# Patient Record
Sex: MALE | Race: WHITE | NOT HISPANIC OR LATINO | Employment: FULL TIME | URBAN - METROPOLITAN AREA
[De-identification: names, ages, dates, MRNs, and addresses within clinical notes are randomized per-mention and may not be internally consistent; named-entity substitution may affect disease eponyms.]

---

## 2017-10-22 DIAGNOSIS — F34.1 DYSTHYMIA: ICD-10-CM

## 2017-10-23 NOTE — TELEPHONE ENCOUNTER
Disp Refills Start End LOAN   FLUoxetine (PROZAC) 20 MG capsule 90 capsule 2 11/22/2016  No   Sig: Take 1 capsule (20 mg) by mouth daily   Class: E-Prescribe   Notes to Pharmacy: Profile Rx: patient will contact pharmacy when needed     Last Office Visit with [facility] primary care provider:  11/22/2016      Last PHQ-9 score on record=   PHQ-9 SCORE 5/9/2017   Total Score MyChart 1 (Minimal depression)   Total Score -     Due for an Office visit (fasting physical) for further refills, only filled for 30 days.    Ni Martinez RN  [facility]

## 2017-11-24 ENCOUNTER — MYC MEDICAL ADVICE (OUTPATIENT)
Dept: FAMILY MEDICINE | Facility: CLINIC | Age: 27
End: 2017-11-24

## 2017-11-24 DIAGNOSIS — F34.1 DYSTHYMIA: ICD-10-CM

## 2017-11-24 NOTE — TELEPHONE ENCOUNTER
Requested Prescriptions   Pending Prescriptions Disp Refills     FLUoxetine (PROZAC) 20 MG capsule [Pharmacy Med Name: FLUOXETINE HCL 20 MG CAPSULE] 30 capsule 0     Sig: TAKE 1 CAPSULE (20 MG) BY MOUTH DAILY    SSRIs Protocol Failed    11/24/2017 12:48 AM       Failed - PHQ-9 score less than 5 in past 6 months    Please review PHQ-9 score.          Failed - Recent (6 mo) or future visit with authorizing provider's specialty    Patient had office visit in the last 6 months or has a visit in the next 30 days with authorizing provider.  See chart review.            Passed - Medication is NOT Bupropion    If the medication is Bupropion (Wellbutrin), and the patient is taking for smoking cessation; OK to refill.         Passed - Patient is age 18 or older        PHQ-9 SCORE 10/21/2016 11/22/2016 5/9/2017   Total Score MyChart - - 1 (Minimal depression)   Total Score 5 1 -     LOV 11/22/2016. Sent mychart with PHQ-9 attached and noted to pt they need an OV as well.  Lilly Santiago RN  [facility]

## 2017-11-24 NOTE — LETTER
[address] [address]                  [phone]   December 4, 2017    Jhony Murphy  [address] [facility] [address]      Dear Jhony,        Thank you for your refill request for yourFLUoxetine (PROZAC) 20 MG capsule   after reviewing your chart you are due for an updated PHQ-9 and DICK-7, which are questionnaires regarding your mood over the last 2 weeks. You are also due for an office visit     Please fill them out and send it back to the clinic, please also call to schedule an appointment       Your test results are enclosed.      Please contact me if you have any questions.    In addition, here is a list of due or overdue Health Maintenance reminders.    Health Maintenance Due   Topic Date Due     Flu Vaccine - yearly  09/01/2017       Please call us at [phone] (or use [employer]) to address the above recommendations.            Thank you very much for trusting [facility]..     Healthy regards,          Ronnie Lutz M.D.

## 2017-12-04 NOTE — TELEPHONE ENCOUNTER
LOV 10/21/2016    Attempt # 3 - letter sent     Called # [phone] (home)     Left a non detailed VM     Would you be willing to fill without and OV or updated phq-9 and gad7 ?     Please advise     Thank you     Svetlana Clements RN, BSN  [facility]

## 2017-12-29 ASSESSMENT — ANXIETY QUESTIONNAIRES
1. FEELING NERVOUS, ANXIOUS, OR ON EDGE: SEVERAL DAYS
3. WORRYING TOO MUCH ABOUT DIFFERENT THINGS: NOT AT ALL
6. BECOMING EASILY ANNOYED OR IRRITABLE: SEVERAL DAYS
GAD7 TOTAL SCORE: 2
4. TROUBLE RELAXING: NOT AT ALL
GAD7 TOTAL SCORE: 2
7. FEELING AFRAID AS IF SOMETHING AWFUL MIGHT HAPPEN: NOT AT ALL
2. NOT BEING ABLE TO STOP OR CONTROL WORRYING: NOT AT ALL
7. FEELING AFRAID AS IF SOMETHING AWFUL MIGHT HAPPEN: NOT AT ALL
5. BEING SO RESTLESS THAT IT IS HARD TO SIT STILL: NOT AT ALL
GAD7 TOTAL SCORE: 2

## 2017-12-29 ASSESSMENT — PATIENT HEALTH QUESTIONNAIRE - PHQ9
SUM OF ALL RESPONSES TO PHQ QUESTIONS 1-9: 3
SUM OF ALL RESPONSES TO PHQ QUESTIONS 1-9: 3
10. IF YOU CHECKED OFF ANY PROBLEMS, HOW DIFFICULT HAVE THESE PROBLEMS MADE IT FOR YOU TO DO YOUR WORK, TAKE CARE OF THINGS AT HOME, OR GET ALONG WITH OTHER PEOPLE: NOT DIFFICULT AT ALL

## 2017-12-30 ASSESSMENT — PATIENT HEALTH QUESTIONNAIRE - PHQ9: SUM OF ALL RESPONSES TO PHQ QUESTIONS 1-9: 3

## 2017-12-30 ASSESSMENT — ANXIETY QUESTIONNAIRES: GAD7 TOTAL SCORE: 2

## 2018-01-03 NOTE — TELEPHONE ENCOUNTER
DICK-7 SCORE 12/29/2017   Total Score 2 (minimal anxiety)   Total Score 2       PHQ-9 SCORE 11/22/2016 5/9/2017 12/29/2017   Total Score MyChart - 1 (Minimal depression) 3 (Minimal depression)   Total Score 1 - 3     Routing to PCP for further review/recommendations/orders.  Pt needs an appt for follow up.  Lilly Santiago RN  [facility]

## 2018-01-12 NOTE — PROGRESS NOTES
"  SUBJECTIVE:   CC: Jhony Murphy is an 27 year old male who presents for preventative health visit.     Healthy Habits:    Do you get at least three servings of calcium containing foods daily (dairy, green leafy vegetables, etc.)? {YES/NO, DAIRY INTAKE:033211::\"yes\"}    Amount of exercise or daily activities, outside of work: {AMOUNT EXERCISE:622289}    Problems taking medications regularly {Yes /No default:358984::\"No\"}    Medication side effects: {Yes /No default.:477974::\"No\"}    Have you had an eye exam in the past two years? {YESNOBLANK:402901}    Do you see a dentist twice per year? {YESNOBLANK:622302}    Do you have sleep apnea, excessive snoring or daytime drowsiness?{YESNOBLANK:424448}  {Outside tests to abstract? :776153}     {additional problems to add (Optional):007246}    Today's PHQ-2 Score:   PHQ-2 ( 1999 Pfizer) 10/21/2016 10/21/2016   Q1: Little interest or pleasure in doing things - 0   Q2: Feeling down, depressed or hopeless 0 0   PHQ-2 Score - 0     {PHQ-2 LOOK IN ASSESSMENTS :193773}  Abuse: Current or Past(Physical, Sexual or Emotional)- {YES/NO/NA:386639}  Do you feel safe in your environment - {YES/NO/NA:206402}    Social History   Substance Use Topics     Smoking status: Never Smoker     Smokeless tobacco: Never Used     Alcohol use Yes      Comment: Social      If you drink alcohol do you typically have >3 drinks per day or >7 drinks per week? {ETOH :259008}                      Last PSA: No results found for: PSA    Reviewed orders with patient. Reviewed health maintenance and updated orders accordingly - {Yes/No:676506::\"Yes\"}  {Chronicprobdata (Optional):332540}    Reviewed and updated as needed this visit by clinical staff         Reviewed and updated as needed this visit by Provider        {HISTORY OPTIONS (Optional):334706}    ROS:  {MALE ROS-adult preventive care package:967503::\"C: NEGATIVE for fever, chills, change in weight\",\"I: NEGATIVE for worrisome rashes, moles or " "lesions\",\"E: NEGATIVE for vision changes or irritation\",\"ENT: NEGATIVE for ear, mouth and throat problems\",\"R: NEGATIVE for significant cough or SOB\",\"CV: NEGATIVE for chest pain, palpitations or peripheral edema\",\"GI: NEGATIVE for nausea, abdominal pain, heartburn, or change in bowel habits\",\" male: negative for dysuria, hematuria, decreased urinary stream, erectile dysfunction, urethral discharge\",\"M: NEGATIVE for significant arthralgias or myalgia\",\"N: NEGATIVE for weakness, dizziness or paresthesias\",\"P: NEGATIVE for changes in mood or affect\"}    OBJECTIVE:   There were no vitals taken for this visit.  EXAM:  {Exam Choices:548284}    ASSESSMENT/PLAN:   {Diag Picklist:051269}    COUNSELING:  {MALE COUNSELING MESSAGES:022679::\"Reviewed preventive health counseling, as reflected in patient instructions\"}    {BP Counseling- Complete if BP >= 120/80  (Optional):089465}   reports that he has never smoked. He has never used smokeless tobacco.  {Tobacco Cessation -- Complete if patient is a smoker (Optional):240729}  Estimated body mass index is 36.48 kg/(m^2) as calculated from the following:    Height as of 11/22/16: 6' (1.829 m).    Weight as of 11/22/16: 269 lb (122 kg).   {Weight Management Plan (ACO) Complete if BMI is abnormal-  Ages 18-64  BMI >24.9.  Age 65+ with BMI <23 or >30 (Optional):359127}    Counseling Resources:  ATP IV Guidelines  Pooled Cohorts Equation Calculator  FRAX Risk Assessment  ICSI Preventive Guidelines  Dietary Guidelines for Americans, 2010  USDA's MyPlate  ASA Prophylaxis  Lung CA Screening    Graham Lutz MD  [facility] [place]  "

## 2018-01-12 NOTE — PROGRESS NOTES
SUBJECTIVE:   CC: Jhony Murphy is an 27 year old male who presents for preventative health visit.     Physical   Annual:     Getting at least 3 servings of Calcium per day::  Yes    Bi-annual eye exam::  Yes    Dental care twice a year::  Yes    Sleep apnea or symptoms of sleep apnea::  None    Diet::  Regular (no restrictions)    Frequency of exercise::  2-3 days/week    Duration of exercise::  15-30 minutes    Taking medications regularly::  Yes    Medication side effects::  None    Additional concerns today::  No          Mood Problems - The patient is currently taking 20 mg of fluoxetine once daily for management of anxiety and depression symptoms. He states the medication controls his symptoms well. When he does not take the medication for a few days he begins to have increased anxiety. The fluoxetine was started in 11/2016. He has a family history of anxiety and depression.    Weight Gain - The patient has gained 15 pounds over the last year. He states that he likes to eat a lot of sugared foods and has soda 3-4 times a week. He had plantar fascitis one year ago which caused him to decrease his daily exercise routine.    Today's PHQ-2 Score:   PHQ-2 ( 1999 Pfizer) 1/12/2018   Q1: Little interest or pleasure in doing things 0   Q2: Feeling down, depressed or hopeless 0   PHQ-2 Score 0   Q1: Little interest or pleasure in doing things Not at all   Q2: Feeling down, depressed or hopeless Not at all   PHQ-2 Score 0       Abuse: Current or Past(Physical, Sexual or Emotional)- NO  Do you feel safe in your environment - Yes    Social History   Substance Use Topics     Smoking status: Never Smoker     Smokeless tobacco: Never Used     Alcohol use Yes      Comment: Social     Alcohol Use 1/12/2018   If you drink alcohol, do you typically have greater than 3 drinks per day OR greater than 7 drinks per week?   No     Last PSA: No results found for: PSA    Reviewed orders with patient. Reviewed health maintenance and  updated orders accordingly - Yes    Reviewed and updated as needed this visit by clinical staff  Tobacco  Allergies  Meds  Problems  Med Hx  Surg Hx  Fam Hx  Soc Hx          Reviewed and updated as needed this visit by Provider  Allergies  Meds  Problems  Surg Hx  Fam Hx          Review of Systems  Constitutional, HEENT, cardiovascular, pulmonary, GI, , musculoskeletal, neuro, skin, endocrine and psych systems are negative, except as otherwise noted.    This document serves as a record of the services and decisions personally performed and made by Graham Lutz MD. It was created on his behalf by Iram Lockett, a trained medical scribe. The creation of this document is based on the provider's statements to the medical scribe.  Iram Lockett 4:18 PM 1/15/2018  OBJECTIVE:   /88 (BP Location: Left arm, Patient Position: Sitting, Cuff Size: Adult Large)  Pulse 72  Temp 98  F (36.7  C) (Oral)  Ht 6' (1.829 m)  Wt 286 lb (129.7 kg)  SpO2 98%  BMI 38.79 kg/m2    Physical Exam  GENERAL: healthy, alert and no distress  EYES: Eyes grossly normal to inspection, PERRL and conjunctivae and sclerae normal  HENT: ear canals and TM's normal, nose and mouth without ulcers or lesions  NECK: no adenopathy, no asymmetry, masses, or scars and thyroid normal to palpation  RESP: lungs clear to auscultation - no rales, rhonchi or wheezes  BREAST: normal without masses, tenderness or nipple discharge and no palpable axillary masses or adenopathy  CV: regular rate and rhythm, normal S1 S2, no S3 or S4, no murmur, click or rub, no peripheral edema and peripheral pulses strong  ABDOMEN: soft, nontender, no hepatosplenomegaly, no masses and bowel sounds normal   (male): normal male genitalia without lesions or urethral discharge, no hernia  MS: no gross musculoskeletal defects noted, no edema  SKIN: no suspicious lesions or rashes  NEURO: Normal strength and tone, mentation intact and speech normal  PSYCH:  mentation appears normal, affect normal/bright  LYMPH: no cervical, supraclavicular, axillary, or inguinal adenopathy    Diagnostic Results:  Future fasting labs  ASSESSMENT/PLAN:   Jhony was seen today for physical.    Diagnoses and all orders for this visit:    Routine general medical examination at a health care facility - followup with fasting labs.  -     Basic metabolic panel; Future  -     Lipid panel reflex to direct LDL Fasting; Future    Dysthymia - controlled - continue medication. Refill provided.  -     FLUoxetine (PROZAC) 20 MG capsule; Take 1 capsule (20 mg) by mouth daily    Need for prophylactic vaccination and inoculation against influenza - patient declined immunization.    Elevated glucose - Discussed decreasing sugar intake. Patient will followup with fasting labs.  -     Basic metabolic panel; Future        COUNSELING:   Reviewed preventive health counseling, as reflected in patient instructions       Regular exercise       Healthy diet/nutrition       Vision screening       Hearing screening       Immunizations    Declined: Influenza due to Concerns about side effects/safety            BP Screening:   Last 3 BP Readings:    BP Readings from Last 3 Encounters:   01/15/18 122/88   11/22/16 126/88   10/21/16 120/88       The following was recommended to the patient:  Re-screen BP within a year and recommended lifestyle modifications       reports that he has never smoked. He has never used smokeless tobacco.    Estimated body mass index is 38.79 kg/(m^2) as calculated from the following:    Height as of this encounter: 6' (1.829 m).    Weight as of this encounter: 286 lb (129.7 kg).   Weight management plan: Discussed healthy diet and exercise guidelines and patient will follow up in 12 months in clinic to re-evaluate.    Counseling Resources:  ATP IV Guidelines  Pooled Cohorts Equation Calculator  FRAX Risk Assessment  ICSI Preventive Guidelines  Dietary Guidelines for Americans, 2010  USDA's  MyPlate  ASA Prophylaxis  Lung CA Screening    The information in this document, created by a scribe for me, accurately reflects the services I personally performed and the decisions made by me. I have reviewed and approved this document for accuracy.    Graham Lutz MD  [facility] [place]  Answers for HPI/ROS submitted by the patient on 1/12/2018   PHQ-2 Score: 0

## 2018-01-15 ENCOUNTER — OFFICE VISIT (OUTPATIENT)
Dept: FAMILY MEDICINE | Facility: CLINIC | Age: 28
End: 2018-01-15
Payer: COMMERCIAL

## 2018-01-15 VITALS
HEART RATE: 72 BPM | TEMPERATURE: 98 F | DIASTOLIC BLOOD PRESSURE: 88 MMHG | SYSTOLIC BLOOD PRESSURE: 122 MMHG | HEIGHT: 72 IN | OXYGEN SATURATION: 98 % | BODY MASS INDEX: 38.74 KG/M2 | WEIGHT: 286 LBS

## 2018-01-15 DIAGNOSIS — Z23 NEED FOR PROPHYLACTIC VACCINATION AND INOCULATION AGAINST INFLUENZA: ICD-10-CM

## 2018-01-15 DIAGNOSIS — F34.1 DYSTHYMIA: ICD-10-CM

## 2018-01-15 DIAGNOSIS — Z00.00 ROUTINE GENERAL MEDICAL EXAMINATION AT A HEALTH CARE FACILITY: Primary | ICD-10-CM

## 2018-01-15 DIAGNOSIS — R73.09 ELEVATED GLUCOSE: ICD-10-CM

## 2018-01-15 PROCEDURE — 99395 PREV VISIT EST AGE 18-39: CPT | Performed by: FAMILY MEDICINE

## 2018-01-15 NOTE — LETTER
My Depression Action Plan  Name: Jhony Murphy   Date of Birth 1990  Date: 1/15/2018    My doctor: Graham Lutz   My clinic: [address] [id number] [phone]          GREEN    ZONE   Good Control    What it looks like:     Things are going generally well. You have normal up s and down s. You may even feel depressed from time to time, but bad moods usually last less than a day.   What you need to do:  1. Continue to care for yourself (see self care plan)  2. Check your depression survival kit and update it as needed  3. Follow your physician s recommendations including any medication.  4. Do not stop taking medication unless you consult with your physician first.           YELLOW         ZONE Getting Worse    What it looks like:     Depression is starting to interfere with your life.     It may be hard to get out of bed; you may be starting to isolate yourself from others.    Symptoms of depression are starting to last most all day and this has happened for several days.     You may have suicidal thoughts but they are not constant.   What you need to do:     1. Call your care team, your response to treatment will improve if you keep your care team informed of your progress. Yellow periods are signs an adjustment may need to be made.     2. Continue your self-care, even if you have to fake it!    3. Talk to someone in your support network    4. Open up your depression survival kit           RED    ZONE Medical Alert - Get Help    What it looks like:     Depression is seriously interfering with your life.     You may experience these or other symptoms: You can t get out of bed most days, can t work or engage in other necessary activities, you have trouble taking care of basic hygiene, or basic responsibilities, thoughts of suicide or death that will not go away, self-injurious behavior.     What you need to do:  1. Call your care  team and request a same-day appointment. If they are not available (weekends or after hours) call your local crisis line, emergency room or 911.      Electronically signed by: Graham Lutz, January 15, 2018    Depression Self Care Plan / Survival Kit    Self-Care for Depression  Here s the deal. Your body and mind are really not as separate as most people think.  What you do and think affects how you feel and how you feel influences what you do and think. This means if you do things that people who feel good do, it will help you feel better.  Sometimes this is all it takes.  There is also a place for medication and therapy depending on how severe your depression is, so be sure to consult with your medical provider and/ or Behavioral Health Consultant if your symptoms are worsening or not improving.     In order to better manage my stress, I will:    Exercise  Get some form of exercise, every day. This will help reduce pain and release endorphins, the  feel good  chemicals in your brain. This is almost as good as taking antidepressants!  This is not the same as joining a gym and then never going! (they count on that by the way ) It can be as simple as just going for a walk or doing some gardening, anything that will get you moving.      Hygiene   Maintain good hygiene (Get out of bed in the morning, Make your bed, Brush your teeth, Take a shower, and Get dressed like you were going to work, even if you are unemployed).  If your clothes don't fit try to get ones that do.    Diet  I will strive to eat foods that are good for me, drink plenty of water, and avoid excessive sugar, caffeine, alcohol, and other mood-altering substances.  Some foods that are helpful in depression are: complex carbohydrates, B vitamins, flaxseed, fish or fish oil, fresh fruits and vegetables.    Psychotherapy  I agree to participate in Individual Therapy (if recommended).    Medication  If prescribed medications, I agree to take them.   Missing doses can result in serious side effects.  I understand that drinking alcohol, or other illicit drug use, may cause potential side effects.  I will not stop my medication abruptly without first discussing it with my provider.    Staying Connected With Others  I will stay in touch with my friends, family members, and my primary care provider/team.    Use your imagination  Be creative.  We all have a creative side; it doesn t matter if it s oil painting, sand castles, or mud pies! This will also kick up the endorphins.    Witness Beauty  (AKA stop and smell the roses) Take a look outside, even in mid-winter. Notice colors, textures. Watch the squirrels and birds.     Service to others  Be of service to others.  There is always someone else in need.  By helping others we can  get out of ourselves  and remember the really important things.  This also provides opportunities for practicing all the other parts of the program.    Humor  Laugh and be silly!  Adjust your TV habits for less news and crime-drama and more comedy.    Control your stress  Try breathing deep, massage therapy, biofeedback, and meditation. Find time to relax each day.     My support system    Clinic Contact:  Phone number:    Contact 1:  Phone number:    Contact 2:  Phone number:    Buddhist/:  Phone number:    Therapist:  Phone number:    Local crisis center:    Phone number:    Other community support:  Phone number:

## 2018-01-15 NOTE — NURSING NOTE
Chief Complaint   Patient presents with     Physical       Initial /88 (BP Location: Left arm, Patient Position: Sitting, Cuff Size: Adult Large)  Pulse 72  Temp 98  F (36.7  C) (Oral)  Ht 6' (1.829 m)  Wt 286 lb (129.7 kg)  SpO2 98%  BMI 38.79 kg/m2 Estimated body mass index is 38.79 kg/(m^2) as calculated from the following:    Height as of this encounter: 6' (1.829 m).    Weight as of this encounter: 286 lb (129.7 kg).  Medication Reconciliation: complete   Urvashi Hamlin Student MA

## 2018-01-15 NOTE — MR AVS SNAPSHOT
After Visit Summary   1/15/2018    Jhony Murphy    MRN: [id number]           Patient Information     Date Of Birth          1990        Visit Information        Provider Department      1/15/2018 4:00 PM Graham Lutz MD [facility] [place]        Today's Diagnoses     Routine general medical examination at a health care facility    -  1    Dysthymia        Need for prophylactic vaccination and inoculation against influenza        Elevated glucose          Care Instructions      Preventive Health Recommendations  Male Ages 26 - 39    Yearly exam:             See your health care provider every year in order to  o   Review health changes.   o   Discuss preventive care.    o   Review your medicines if your doctor has prescribed any.    You should be tested each year for STDs (sexually transmitted diseases), if you re at risk.     After age 35, talk to your provider about cholesterol testing. If you are at risk for heart disease, have your cholesterol tested at least every 5 years.     If you are at risk for diabetes, you should have a diabetes test (fasting glucose).  Shots: Get a flu shot each year. Get a tetanus shot every 10 years.     Nutrition:    Eat at least 5 servings of fruits and vegetables daily.     Eat whole-grain bread, whole-wheat pasta and brown rice instead of white grains and rice.     Talk to your provider about Calcium and Vitamin D.     Lifestyle    Exercise for at least 150 minutes a week (30 minutes a day, 5 days a week). This will help you control your weight and prevent disease.     Limit alcohol to one drink per day.     No smoking.     Wear sunscreen to prevent skin cancer.     See your dentist every six months for an exam and cleaning.             Follow-ups after your visit        Follow-up notes from your care team     Return in about 1 year (around 1/15/2019) for Wellness Visit with fasting labs.      Future tests that were ordered for you today     Open  Future Orders        Priority Expected Expires Ordered    Basic metabolic panel Routine 2/14/2018 3/16/2018 1/15/2018    Lipid panel reflex to direct LDL Fasting Routine 2/14/2018 3/16/2018 1/15/2018            Who to contact     If you have questions or need follow up information about today's clinic visit or your schedule please contact [facility] directly at [phone].  Normal or non-critical lab and imaging results will be communicated to you by Constructhart, letter or phone within 4 business days after the clinic has received the results. If you do not hear from us within 7 days, please contact the clinic through HyTrustt or phone. If you have a critical or abnormal lab result, we will notify you by phone as soon as possible.  Submit refill requests through CommuniClique or call your pharmacy and they will forward the refill request to us. Please allow 3 business days for your refill to be completed.          Additional Information About Your Visit        [employer] Information     Communi[employer] gives you secure access to your electronic health record. If you see a primary care provider, you can also send messages to your care team and make appointments. If you have questions, please call your primary care clinic.  If you do not have a primary care provider, please call [phone] and they will assist you.        Care EveryWhere ID     This is your Care EveryWhere ID. This could be used by other organizations to access your [place] medical records  [id number]        Your Vitals Were     Pulse Temperature Height Pulse Oximetry BMI (Body Mass Index)       72 98  F (36.7  C) (Oral) 6' (1.829 m) 98% 38.79 kg/m2        Blood Pressure from Last 3 Encounters:   01/15/18 122/88   11/22/16 126/88   10/21/16 120/88    Weight from Last 3 Encounters:   01/15/18 286 lb (129.7 kg)   11/22/16 269 lb (122 kg)   10/21/16 273 lb (123.8 kg)                 Today's Medication Changes          These changes are accurate as of:  1/15/18  4:40 PM.  If you have any questions, ask your nurse or doctor.               These medicines have changed or have updated prescriptions.        Dose/Directions    FLUoxetine 20 MG capsule   Commonly known as:  PROzac   This may have changed:  additional instructions   Used for:  Dysthymia   Changed by:  Graham Lutz MD        Dose:  20 mg   Take 1 capsule (20 mg) by mouth daily   Quantity:  90 capsule   Refills:  3            Where to get your medicines      These medications were sent to [facility] - [place], [place] [facility] [address] [address]  [address] [facility] [address], [place] [address]     Phone:  [phone]     FLUoxetine 20 MG capsule                Primary Care Provider Office Phone # Fax #    Graham Lutz [phone]       [facility] [facility] [address]        Equal Access to Services     [facility]: Hadii aad ku hadasho Soomaali, waaxda luqadaha, qaybta kaalmada adeegyada, sujatha swift haykari payne . So [facility] [phone].    ATENCIÓN: Si habla español, tiene a garcia disposición servicios gratuitos de asistencia lingüística. [facility] [phone].    We comply with applicable federal civil rights laws and [place] laws. We do not discriminate on the basis of race, color, national origin, age, disability, sex, sexual orientation, or gender identity.            Thank you!     Thank you for choosing [facility]  for your care. Our goal is always to provide you with excellent care. Hearing back from our patients is one way we can continue to improve our services. Please take a few minutes to complete the written survey that you may receive in the mail after your visit with us. Thank you!             Your Updated Medication List - Protect others around you: Learn how to safely use, store and throw away your medicines at www.disposemymeds.org.          This list is accurate as of: 1/15/18  4:40 PM.  Always use your most recent med list.                    Brand Name Dispense Instructions for use Diagnosis    FLUoxetine 20 MG capsule    PROzac    90 capsule    Take 1 capsule (20 mg) by mouth daily    Dysthymia

## 2018-07-23 ENCOUNTER — TELEPHONE (OUTPATIENT)
Dept: FAMILY MEDICINE | Facility: CLINIC | Age: 28
End: 2018-07-23

## 2018-07-23 NOTE — TELEPHONE ENCOUNTER
Date Forms was received: July 23, 2018    Forms received by: Fax    Last office visit: 01/15/2018    Purpose of Form:  Health screen form    When the form is due:  ASAP    How the form needs to be returned for patient:  Fax to  [phone]    Form currently placed  [employer]

## 2018-07-24 NOTE — TELEPHONE ENCOUNTER
Completed forms faxed back to [employer] at [phone].   Originals sent to be scanned.     Latosha Zelaya

## 2019-01-27 DIAGNOSIS — F34.1 DYSTHYMIA: ICD-10-CM

## 2019-01-27 NOTE — LETTER
[address] [address]                                                                                                       [phone]    January 31, 2019    Jhony Murphy  [address] [facility] [address]      To Whom it May Concern:    Thank you for your refill request for your FLUoxetine (PROZAC) 20 MG capsuleFLUoxetine (PROZAC) 20 MG capsule. After reviewing your chart, you are due for an updated PHQ-9 and DICK-7, which are questionnaires regarding your mood over the last 2 weeks.     Please fill them out and send it back to me.     Thank you for your time.      Sincerely,          Ronnie Lutz M.D./NORBERTO, RN

## 2019-01-28 NOTE — TELEPHONE ENCOUNTER
"Requested Prescriptions   Pending Prescriptions Disp Refills     FLUoxetine (PROZAC) 20 MG capsule [Pharmacy Med Name: FLUOXETINE HCL 20 MG CAPSULE] 90 capsule 3    Last Written Prescription Date:  1.15.18  Last Fill Quantity: 90,  # refills: 3   Last Office Visit: 1/15/2018   Future Office Visit:       PHQ-9 SCORE 11/22/2016 5/9/2017 12/29/2017   PHQ-9 Total Score MyChart - 1 (Minimal depression) 3 (Minimal depression)   PHQ-9 Total Score 1 - 3     DICK-7 SCORE 12/29/2017   Total Score 2 (minimal anxiety)   Total Score 2          Sig: TAKE 1 CAPSULE (20 MG) BY MOUTH DAILY    SSRIs Protocol Failed - 1/27/2019 12:41 AM       Failed - PHQ-9 score less than 5 in past 6 months    Please review last PHQ-9 score.          Failed - Recent (6 mo) or future (30 days) visit within the authorizing provider's specialty    Patient had office visit in the last 6 months or has a visit in the next 30 days with authorizing provider or within the authorizing provider's specialty.  See \"Patient Info\" tab in inbasket, or \"Choose Columns\" in Meds & Orders section of the refill encounter.           Passed - Medication is active on med list       Passed - Patient is age 18 or older          "

## 2019-01-31 NOTE — TELEPHONE ENCOUNTER
Refill(s) x 1 only.  Please call the patient and schedule a followup appointment within the next month.

## 2019-01-31 NOTE — TELEPHONE ENCOUNTER
Patient has not viewed last 2 [employer] Messages    Attempt #3  Called patient @ [phone] - Left a non-detailed message to call back and speak with any triage nurse.    Letter sent with PHQ9/GAD7 and self-addressed, stamped, return envelope    Routing refill request to provider for review/approval because:  Need updated PHQ9        Ni Martinez RN  [place] [facility]

## 2019-05-21 DIAGNOSIS — F34.1 DYSTHYMIA: ICD-10-CM

## 2019-05-21 NOTE — TELEPHONE ENCOUNTER
"Requested Prescriptions   Pending Prescriptions Disp Refills     FLUoxetine (PROZAC) 20 MG capsule [Pharmacy Med Name: FLUOXETINE HCL 20 MG CAPSULE] 90 capsule 0     Sig: TAKE 1 CAPSULE (20 MG) BY MOUTH DAILY       Last Written Prescription Date:  1/31/2019  Last Fill Quantity: 90,  # refills: 0   Last office visit: 1/15/2018 with prescribing provider:     Future Office Visit:          SSRIs Protocol Failed - 5/21/2019 10:34 AM        Failed - PHQ-9 score less than 5 in past 6 months     Please review last PHQ-9 score.           Failed - Recent (6 mo) or future (30 days) visit within the authorizing provider's specialty     Patient had office visit in the last 6 months or has a visit in the next 30 days with authorizing provider or within the authorizing provider's specialty.  See \"Patient Info\" tab in inbasket, or \"Choose Columns\" in Meds & Orders section of the refill encounter.            Passed - Medication is active on med list        Passed - Patient is age 18 or older        "

## 2019-05-21 NOTE — LETTER
[facility]  [address] [place] [address]                                                                                                       [phone]    María 3, 2019    Jhony Murphy  [address] [facility] [address]      To Whom it May Concern:    My staff have been attempting to reach you in regards to a recent refill request for: FLUoxetine (PROZAC) 20 MG capsule.  After reviewing your chart, you are overdue for an office visit. You have been given a 30 day supply of the medication but will need to be seen for further refills.     Please contact my office at [phone].     Thank you for your time.        Sincerely,          Ronnie Lutz M.D./NORBERTO, RN

## 2019-05-21 NOTE — TELEPHONE ENCOUNTER
My chart message sent - due for an OV and questions     Awaiting for reply     Svetlana Clements RN, BSN  [place] [facility]

## 2019-05-22 NOTE — TELEPHONE ENCOUNTER
PHQ-9 SCORE 5/9/2017 12/29/2017 5/21/2019   PHQ-9 Total Score MyChart 1 (Minimal depression) 3 (Minimal depression) 9 (Mild depression)   PHQ-9 Total Score - 3 9     DICK-7 SCORE 12/29/2017 5/21/2019   Total Score 2 (minimal anxiety) 4 (minimal anxiety)   Total Score 2 4       Please review and advise on questions and refills     Thank you     Svetlana Clements RN, BSN  [place] [facility]

## 2019-05-24 NOTE — TELEPHONE ENCOUNTER
Left non-detailed message for patient to call back.  Please schedule follow up when patient calls back.  (see previous notes for details)    Latosha Zelaya

## 2019-05-31 NOTE — TELEPHONE ENCOUNTER
Attempt #2  Called patient @ [phone] - Left a non-detailed message to call back.    If patient calls back, please schedule a MED CHECK within 1 month and close encounter.       Ni Martinez RN  [facility]

## 2019-06-03 NOTE — TELEPHONE ENCOUNTER
Attempt #3  Called patient @ [phone] - Left a non-detailed message to call back.     If patient calls back, please schedule a MED CHECK within 1 month and close encounter.      Letter sent  Closing encounter    Ni Martinez RN  [facility]

## 2019-07-24 DIAGNOSIS — F34.1 DYSTHYMIA: ICD-10-CM

## 2019-07-25 NOTE — TELEPHONE ENCOUNTER
"Requested Prescriptions   Pending Prescriptions Disp Refills     FLUoxetine (PROZAC) 20 MG capsule [Pharmacy Med Name: FLUOXETINE HCL 20 MG CAPSULE]  Last Written Prescription Date:  5/22/2019  Last Fill Quantity: 30 cap,  # refills: 0     Last Office Visit: 1/15/2018 Nelda  Future Office Visit:      30 capsule 0     Sig: TAKE 1 CAPSULE BY MOUTH EVERY DAY       SSRIs Protocol Failed - 7/24/2019  6:58 PM        Failed - PHQ-9 score less than 5 in past 6 months     Please review last PHQ-9 score.      PHQ-9 SCORE 5/9/2017 12/29/2017 5/21/2019   PHQ-9 Total Score MyChart 1 (Minimal depression) 3 (Minimal depression) 9 (Mild depression)   PHQ-9 Total Score - 3 9     DICK-7 SCORE 12/29/2017 5/21/2019   Total Score 2 (minimal anxiety) 4 (minimal anxiety)   Total Score 2 4          Failed - Recent (6 mo) or future (30 days) visit within the authorizing provider's specialty     Patient had office visit in the last 6 months or has a visit in the next 30 days with authorizing provider or within the authorizing provider's specialty.  See \"Patient Info\" tab in inbasket, or \"Choose Columns\" in Meds & Orders section of the refill encounter.            Passed - Medication is active on med list        Passed - Patient is age 18 or older        "

## 2019-07-25 NOTE — TELEPHONE ENCOUNTER
Routing refill request to provider for review/approval because:  Meredith given x1 and patient did not follow up, please advise as multiple attempts made to contact patient at previous refill and no appointment scheduled.  Patient needs to be seen because:  Due for anxiety and depression follow up  PHQ-9 > [facility] protocol for REGINALD FRENCH RN   [facility] [place]

## 2019-08-13 ENCOUNTER — OFFICE VISIT (OUTPATIENT)
Dept: FAMILY MEDICINE | Facility: CLINIC | Age: 29
End: 2019-08-13
Payer: COMMERCIAL

## 2019-08-13 VITALS
WEIGHT: 300 LBS | BODY MASS INDEX: 40.63 KG/M2 | TEMPERATURE: 98.4 F | HEIGHT: 72 IN | HEART RATE: 65 BPM | DIASTOLIC BLOOD PRESSURE: 80 MMHG | SYSTOLIC BLOOD PRESSURE: 120 MMHG | OXYGEN SATURATION: 97 %

## 2019-08-13 DIAGNOSIS — E66.01 MORBID OBESITY (H): ICD-10-CM

## 2019-08-13 DIAGNOSIS — Z00.00 ROUTINE GENERAL MEDICAL EXAMINATION AT A HEALTH CARE FACILITY: Primary | ICD-10-CM

## 2019-08-13 DIAGNOSIS — F34.1 DYSTHYMIA: ICD-10-CM

## 2019-08-13 LAB
ERYTHROCYTE [DISTWIDTH] IN BLOOD BY AUTOMATED COUNT: 12.4 % (ref 10–15)
HCT VFR BLD AUTO: 43.1 % (ref 40–53)
HGB BLD-MCNC: 14.4 G/DL (ref 13.3–17.7)
MCH RBC QN AUTO: 30.1 PG (ref 26.5–33)
MCHC RBC AUTO-ENTMCNC: 33.4 G/DL (ref 31.5–36.5)
MCV RBC AUTO: 90 FL (ref 78–100)
PLATELET # BLD AUTO: 193 10E9/L (ref 150–450)
RBC # BLD AUTO: 4.78 10E12/L (ref 4.4–5.9)
WBC # BLD AUTO: 6.1 10E9/L (ref 4–11)

## 2019-08-13 PROCEDURE — 36415 COLL VENOUS BLD VENIPUNCTURE: CPT | Performed by: FAMILY MEDICINE

## 2019-08-13 PROCEDURE — 80048 BASIC METABOLIC PNL TOTAL CA: CPT | Performed by: FAMILY MEDICINE

## 2019-08-13 PROCEDURE — 85027 COMPLETE CBC AUTOMATED: CPT | Performed by: FAMILY MEDICINE

## 2019-08-13 PROCEDURE — 99395 PREV VISIT EST AGE 18-39: CPT | Performed by: FAMILY MEDICINE

## 2019-08-13 PROCEDURE — 80061 LIPID PANEL: CPT | Performed by: FAMILY MEDICINE

## 2019-08-13 ASSESSMENT — MIFFLIN-ST. JEOR: SCORE: 2363.79

## 2019-08-13 NOTE — PROGRESS NOTES
SUBJECTIVE:   CC: Jhony Murphy is an 28 year old male who presents for preventative health visit.     Healthy Habits:     Getting at least 3 servings of Calcium per day:  Yes    Bi-annual eye exam:  NO    Dental care twice a year:  Yes    Sleep apnea or symptoms of sleep apnea:  None and Excessive snoring    Diet:  Regular (no restrictions)    Frequency of exercise:  4-5 days/week    Duration of exercise:  30-45 minutes    Taking medications regularly:  Yes    Medication side effects:  Not applicable    PHQ-2 Total Score: 0    Additional concerns today:  No    Medication Followup of  Dysthymia- Prozac    Taking Medication as prescribed: yes    Side Effects:  None    Medication Helping Symptoms:  Yes    Jhony reports that he's been doing well with his Prozac 20mg daily without complication. He's content with his current dosage and keeping his mood in good control. He notes missing a dosage for about 3-4 days which he noticed slight changes however has been otherwise diligent. He's been on a good workout regimen as well. His wife reports that he's been snoring a bit more frequently than usual but his symptoms do not affect his overall sleep -- he attributes any sleep concerns due to managing his kids.       Today's PHQ-2 Score:   PHQ-2 ( 1999 Pfizer) 8/13/2019   Q1: Little interest or pleasure in doing things 0   Q2: Feeling down, depressed or hopeless 0   PHQ-2 Score 0   Q1: Little interest or pleasure in doing things -   Q2: Feeling down, depressed or hopeless -   PHQ-2 Score -       Abuse: Current or Past(Physical, Sexual or Emotional)- No  Do you feel safe in your environment? Yes    Social History     Tobacco Use     Smoking status: Never Smoker     Smokeless tobacco: Never Used   Substance Use Topics     Alcohol use: Yes     Comment: Social     If you drink alcohol do you typically have >3 drinks per day or >7 drinks per week? No    Alcohol Use 8/13/2019   Prescreen: >3 drinks/day or >7 drinks/week? -    Prescreen: >3 drinks/day or >7 drinks/week? No       Last PSA: No results found for: PSA    Reviewed orders with patient. Reviewed health maintenance and updated orders accordingly - Yes    Reviewed and updated as needed this visit by clinical staff  Tobacco  Allergies  Meds  Problems  Med Hx  Surg Hx  Fam Hx  Soc Hx          Reviewed and updated as needed this visit by Provider  Tobacco  Allergies  Meds  Problems  Med Hx  Surg Hx  Fam Hx          Review of Systems  Constitutional, HEENT, cardiovascular, pulmonary, GI, , musculoskeletal, neuro, skin, endocrine and psych systems are negative, except as otherwise noted.  This document serves as a record of the services and decisions personally performed and made by Graham Lutz MD. It was created on his behalf by Kyree Massey, a trained medical scribe. The creation of this document is based the provider's statements to the medical scribe.  Scribe Kyree Massey 12:05 PM, August 13, 2019    OBJECTIVE:   /80   Pulse 65   Temp 98.4  F (36.9  C) (Oral)   Ht 1.829 m (6')   Wt 136.1 kg (300 lb)   SpO2 97%   BMI 40.69 kg/m      Physical Exam  GENERAL: healthy, alert, well nourished, well hydrated, no distress  EYES: Eyes grossly normal to inspection, extraocular movements - intact, and PERRL  HENT: ear canals- normal; TMs- normal; Nose- normal; Mouth- no ulcers, no lesions  NECK: no tenderness, no adenopathy, no asymmetry, no masses, no stiffness; thyroid- normal to palpation  RESP: lungs clear to auscultation - no rales, no rhonchi, no wheezes  CV: regular rates and rhythm, normal S1 S2, no S3 or S4 and no murmur, no click or rub -  ABDOMEN: soft, no tenderness, no  hepatosplenomegaly, no masses, normal bowel sounds  MS: extremities- no gross deformities noted, no edema  SKIN: no suspicious lesions, no rashes  BACK: no CVA tenderness, no paralumbar tenderness  - male: testicles- normal, no atrophy, no masses;  no inguinal hernias  RECTAL-  deferred  PSYCH: Alert and oriented times 3; speech- coherent , normal rate and volume; able to articulate logical thoughts, able to abstract reason, no tangential thoughts, no hallucinations or delusions, affect- normal  LYMPHATICS: ant. cervical- normal, post. cervical- normal, axillary- normal, supraclavicular- normal, inguinal- normal  NEURO: strength and tone- normal, sensory exam- grossly normal, mentation- intact speech- normal, reflexes- symmetric    ASSESSMENT/PLAN:   Jhony was seen today for physical.    Diagnoses and all orders for this visit:    Routine general medical examination at a health care facility  -     Basic metabolic panel  -     CBC with platelets  -     Lipid panel reflex to direct LDL Fasting    Dysthymia - Controlled, doing well, continue:   -     FLUoxetine (PROZAC) 20 MG capsule; Take 1 capsule (20 mg) by mouth daily    Morbid obesity (H) - continue /  Recommended diet and exercise.    COUNSELING:   Reviewed preventive health counseling, as reflected in patient instructions       Regular exercise       Healthy diet/nutrition  Estimated body mass index is 40.69 kg/m  as calculated from the following:    Height as of this encounter: 1.829 m (6').    Weight as of this encounter: 136.1 kg (300 lb).  Weight management plan: Discussed healthy diet and exercise guidelines   reports that he has never smoked. He has never used smokeless tobacco.    Counseling Resources:  ATP IV Guidelines  Pooled Cohorts Equation Calculator  FRAX Risk Assessment  ICSI Preventive Guidelines  Dietary Guidelines for Americans, 2010  USDA's MyPlate  ASA Prophylaxis  Lung CA Screening    The information in this document, created by the medical scribe for me, accurately reflects the services I personally performed and the decisions made by me. I have reviewed and approved this document for accuracy prior to leaving the patient care area.  12:18 PM, 08/13/19    Graham Lutz MD  [facility]

## 2019-08-14 LAB
ANION GAP SERPL CALCULATED.3IONS-SCNC: 8 MMOL/L (ref 3–14)
BUN SERPL-MCNC: 12 MG/DL (ref 7–30)
CALCIUM SERPL-MCNC: 9.3 MG/DL (ref 8.5–10.1)
CHLORIDE SERPL-SCNC: 107 MMOL/L (ref 94–109)
CHOLEST SERPL-MCNC: 230 MG/DL
CO2 SERPL-SCNC: 25 MMOL/L (ref 20–32)
CREAT SERPL-MCNC: 0.91 MG/DL (ref 0.66–1.25)
GFR SERPL CREATININE-BSD FRML MDRD: >90 ML/MIN/{1.73_M2}
GLUCOSE SERPL-MCNC: 95 MG/DL (ref 70–99)
HDLC SERPL-MCNC: 35 MG/DL
LDLC SERPL CALC-MCNC: 154 MG/DL
NONHDLC SERPL-MCNC: 195 MG/DL
POTASSIUM SERPL-SCNC: 4.6 MMOL/L (ref 3.4–5.3)
SODIUM SERPL-SCNC: 140 MMOL/L (ref 133–144)
TRIGL SERPL-MCNC: 204 MG/DL

## 2019-08-16 NOTE — RESULT ENCOUNTER NOTE
Dear Jhony,    Here is a summary of your recent test results:  -Normal red blood cell (hgb) levels, normal white blood cell count and normal platelet levels.  -LDL(bad) cholesterol level is elevated, HDL(good) cholesterol level is low and your triglycerides are elevated which can increase your heart disease risk.  A diet high in fat and simple carbohydrates, genetics and being overweight can contribute to this. ADVISE: exercising 150 minutes of aerobic exercise per week (30 minutes for 5 days per week or 50 minutes for 3 days per week are options), eating a low saturated fat/low carbohydrate diet, and omega-3 fatty acids (fish oil) 6342-2732 mg daily are helpful to improve this. In 12 months, you should recheck your fasting cholesterol panel by scheduling a lab-only appointment.  -Kidney function is normal (Cr, GFR), Sodium is normal, Potassium is normal, Calcium is normal, Glucose is normal.     For additional lab test information, labtestsonline.org is an excellent reference.           Thank you very much for trusting me and [facility].     Healthy regards,  Ronnie Lutz MD

## 2020-10-21 DIAGNOSIS — F34.1 DYSTHYMIA: ICD-10-CM

## 2020-10-21 NOTE — TELEPHONE ENCOUNTER
Due for a Wellness visit,  Please call and schedule. Only 1 refill has been sent.    Last visit in this dept:    8/13/2019     Next visit in this dept:       Health Maintenance   Topic Date Due     PHQ-9  11/21/2019     PREVENTIVE CARE VISIT  08/13/2020     INFLUENZA VACCINE (1) 09/01/2020     DTAP/TDAP/TD IMMUNIZATION (2 - Td) 05/06/2025     DEPRESSION ACTION PLAN  Completed     Pneumococcal Vaccine: Pediatrics (0 to 5 Years) and At-Risk Patients (6 to 64 Years)  Aged Out     IPV IMMUNIZATION  Aged Out     MENINGITIS IMMUNIZATION  Aged Out     HEPATITIS B IMMUNIZATION  Aged Out     HIV SCREENING  Discontinued

## 2020-10-21 NOTE — LETTER
[address] [address]  [phone]       November 2, 2020    Jhony Murphy  [address] [facility] [address]    Jhony:    We have been calling you regarding a recent refill request we received for prozac.  Unfortunately, we were unable to reach you.  We are notifying you that you are due for an annual office visit  prior to your next refill.  You can schedule this appointment via Giftah or by calling the clinic at [phone].      Sincerely,          Ronnie Lutz M.D./calvin

## 2020-10-21 NOTE — TELEPHONE ENCOUNTER
Routing refill request to provider for review/approval because:  Labs not current:  PHQ-9  Patient needs to be seen because it has been more than 1 year since last office visit.

## 2020-11-02 NOTE — TELEPHONE ENCOUNTER
Left non-detailed message for patient to call back.  Please schedule annual visit  when patient calls back.  (see previous notes for details)    I have been unable to reach this patient by phone.  A letter is being sent to the last known home address.      Thanks Criss

## 2021-11-11 DIAGNOSIS — F34.1 DYSTHYMIA: ICD-10-CM

## 2021-11-11 NOTE — LETTER
[facility] [address] [address] [phone]       December 1, 2021    Jhony Murphy  [address] [facility] [address]    To Jhony:     We have been calling you regarding a recent refill request we received for Prozac.  Unfortunately, we were unable to reach you.  We are notifying you that you are due for wellness prior to your next refill.  You can schedule this appointment via CyberSettle or by calling the clinic at [phone] Option 1.        Sincerely,          Ronnie Lutz M.D./crc

## 2021-11-15 NOTE — TELEPHONE ENCOUNTER
Routing refill request to provider for review/approval because:  Patient needs to be seen because it has been more than 1 year since last office visit.  Phq 9 score out of date      PHQ 11/22/2016 12/29/2017 5/21/2019   PHQ-9 Total Score 1 3 9   Q9: Thoughts of better off dead/self-harm past 2 weeks Not at all Not at all Not at all

## 2021-11-16 NOTE — TELEPHONE ENCOUNTER
Overdue for visit, 1 refill sent, please schedule appointment to be seen, wellness okay as well.    Last visit in this dept:    8/13/2019     Next visit in this dept:       Health Maintenance   Topic Date Due     ANNUAL REVIEW OF HM ORDERS  Never done     ADVANCE CARE PLANNING  Never done     COVID-19 Vaccine (1) Never done     HEPATITIS C SCREENING  Never done     PHQ-9  11/21/2019     PREVENTIVE CARE VISIT  08/13/2020     INFLUENZA VACCINE (1) Never done     DTAP/TDAP/TD IMMUNIZATION (2 - Td or Tdap) 05/06/2025     DEPRESSION ACTION PLAN  Completed     Pneumococcal Vaccine: Pediatrics (0 to 5 Years) and At-Risk Patients (6 to 64 Years)  Aged Out     IPV IMMUNIZATION  Aged Out     MENINGITIS IMMUNIZATION  Aged Out     HEPATITIS B IMMUNIZATION  Aged Out     HIV SCREENING  Discontinued

## 2022-01-13 DIAGNOSIS — F34.1 DYSTHYMIA: ICD-10-CM

## 2022-01-13 NOTE — LETTER
[address] [address]  [phone]       January 24, 2022    Jhony Murphy  [address] [facility] [id number]    Jhony:    We have been calling you regarding a recent refill request we received for Fluoxetine.  Unfortunately, we were unable to reach you.  We are notifying you that you are due for an in clinic office visit  prior to any refilsl.  You can schedule this appointment via Hyperic or by calling the clinic at [phone].        Sincerely,          Ronnie Lutz M.D./calvin

## 2022-01-17 NOTE — TELEPHONE ENCOUNTER
Pt has not been seen in 2 year was given pino RF in November    No appt has been scheduled-   Please HEENA or ness Marmolejo, RN

## 2022-04-26 DIAGNOSIS — G47.30 SLEEP APNEA: ICD-10-CM

## 2022-04-26 DIAGNOSIS — R06.83 SNORING: Primary | ICD-10-CM

## 2022-08-03 ENCOUNTER — VIRTUAL VISIT (OUTPATIENT)
Dept: SLEEP MEDICINE | Facility: CLINIC | Age: 32
End: 2022-08-03
Payer: COMMERCIAL

## 2022-08-03 VITALS — HEIGHT: 72 IN | WEIGHT: 298 LBS | BODY MASS INDEX: 40.36 KG/M2

## 2022-08-03 DIAGNOSIS — R06.83 SNORING: ICD-10-CM

## 2022-08-03 DIAGNOSIS — G47.30 OBSERVED SLEEP APNEA: Primary | ICD-10-CM

## 2022-08-03 DIAGNOSIS — E66.01 MORBID OBESITY (H): ICD-10-CM

## 2022-08-03 DIAGNOSIS — Z91.89 RISK FACTORS FOR OBSTRUCTIVE SLEEP APNEA: ICD-10-CM

## 2022-08-03 PROCEDURE — 99203 OFFICE O/P NEW LOW 30 MIN: CPT | Mod: 95 | Performed by: INTERNAL MEDICINE

## 2022-08-03 ASSESSMENT — SLEEP AND FATIGUE QUESTIONNAIRES
HOW LIKELY ARE YOU TO NOD OFF OR FALL ASLEEP WHILE SITTING AND READING: SLIGHT CHANCE OF DOZING
HOW LIKELY ARE YOU TO NOD OFF OR FALL ASLEEP WHILE SITTING AND TALKING TO SOMEONE: WOULD NEVER DOZE
HOW LIKELY ARE YOU TO NOD OFF OR FALL ASLEEP IN A CAR, WHILE STOPPED FOR A FEW MINUTES IN TRAFFIC: WOULD NEVER DOZE
HOW LIKELY ARE YOU TO NOD OFF OR FALL ASLEEP WHILE LYING DOWN TO REST IN THE AFTERNOON WHEN CIRCUMSTANCES PERMIT: SLIGHT CHANCE OF DOZING
HOW LIKELY ARE YOU TO NOD OFF OR FALL ASLEEP WHILE WATCHING TV: WOULD NEVER DOZE
HOW LIKELY ARE YOU TO NOD OFF OR FALL ASLEEP WHEN YOU ARE A PASSENGER IN A CAR FOR AN HOUR WITHOUT A BREAK: HIGH CHANCE OF DOZING
HOW LIKELY ARE YOU TO NOD OFF OR FALL ASLEEP WHILE SITTING INACTIVE IN A PUBLIC PLACE: WOULD NEVER DOZE
HOW LIKELY ARE YOU TO NOD OFF OR FALL ASLEEP WHILE SITTING QUIETLY AFTER LUNCH WITHOUT ALCOHOL: WOULD NEVER DOZE

## 2022-08-03 NOTE — LETTER
8/3/2022         RE: Jhony Murphy  [address] [facility] [address]        Dear Colleague,    Thank you for referring your patient, Jhony Murphy, to the [facility]. Please see a copy of my visit note below.    Jhony is a 31 year old who is being evaluated via a billable video visit.      How would you like to obtain your AVS? MyChart  If the video visit is dropped, the invitation should be resent by: Send to e-mail at: [email or web address]  Will anyone else be joining your video visit? Wendy Garcai    Video-Visit Details    Video Start Time: 1:02 PM    Type of service:  Video Visit    Video End Time:1:27 PM    Originating Location (pt. Location): Other Office    Distant Location (provider location):  [facility]     Platform used for Video Visit: [employer]     Chief complaint: Consultation requested by Annalisa Brizuela NP for evaluation of snoring and observed apnea    History of Present Illness: 31-year-old gentleman who is urged to be evaluated by his wife.  She has been observing progressively loud snoring and increasing frequency of apnea.  She is noticing this 4-5 times a week.  She will notched him to wake him up.  He will switch positions.  The episodes occur less frequently on his sides.  He has gained some weight in the last few years and has been actively working on weight loss recently.  He thinks there has been a little bit of improvement least recently.  He does wake up feeling unrefreshed but otherwise does well during the day.  He is not drinking caffeinated beverages routinely.  He is not routinely taking naps.  He does get a little drowsy at work sometimes during less engaging activities.  But there has been no impact on his productivity or function.  No sleepiness behind the wheel.    He occasionally will have a dry mouth but denies waking up with sore throat or headaches.  Denies waking up with gasping or  choking or shortness of breath.  No difficulty breathing through his nose at night.  No significant issues with nocturia.  He is dentist has observed some evidence of bruxism but patient denies waking up with sore jaws or headaches.    He occasionally feels restlessness in his legs while watching TV in the evening but usually that resolves when he gets up and gets ready for bed.  Does not impact his ability to fall asleep.  No issues with insomnia.  He keeps a reasonably  regular schedule going to bed between 11 and 12 and getting up around 7.  He does not take any sleep aids.  He does not drink alcohol with any regularity.    No known family history of sleep apnea.    No problems with nightmares, sleepwalking, sleep talking or dream enactment behavior.    Depression is well controlled on 1 medication.    [place] Sleepiness Scale   Sitting and reading: Slight chance of dozing   Watching TV: Would never doze   Sitting, inactive in a public place (e.g. a theatre or a meeting): Would never doze   As a passenger in a car for an hour without a break: High chance of dozing   Lying down to rest in the afternoon when circumstances permit: Slight chance of dozing   Sitting and talking to someone: Would never doze   Sitting quietly after a lunch without alcohol: Would never doze   In a car, while stopped for a few minutes in traffic: Would never doze   Total score - [place]: 5   (Less than 10 normal)    Insomnia Severity Scale  RACHEL Total Score: 7  Total score categories:  0-7 = No clinically significant insomnia   8-14 = Subthreshold insomnia   15-21 = Clinical insomnia (moderate severity)  22-28 = Clinical insomnia (severe)    STOP-BANG  Loud Snore   1  Excessively Tired/Sleepy   0  Observed apnea   1  Hypertension   0  BMI> 35 kg/m2   1  Age >50   0  Neck >16 in/40cm   1  Male Gender   1  Total =   5  (0-2 low, 3-4 intermediate, 5-8 high risk of YURIY)      Past Medical History:   Diagnosis Date     Depressive disorder  10/21/2016       No Known Allergies    Current Outpatient Medications   Medication     FLUoxetine (PROZAC) 20 MG capsule     No current facility-administered medications for this visit.       Social History     Socioeconomic History     Marital status:      Spouse name: Jannette     Number of children: 3     Years of education: Not on file     Highest education level: Not on file   Occupational History     Occupation: [employer]ior[employer]     Tobacco Use     Smoking status: Never Smoker     Smokeless tobacco: Never Used   Vaping Use     Vaping Use: Never used   Substance and Sexual Activity     Alcohol use: Yes     Comment: Social     Drug use: No     Sexual activity: Yes     Partners: Female     Birth control/protection: Condom, Natural Family Planning   Other Topics Concern     Parent/sibling w/ CABG, MI or angioplasty before 65F 55M? No   Social History Narrative     Not on file     Social Determinants of Health     Financial Resource Strain: Not on file   Food Insecurity: Not on file   Transportation Needs: Not on file   Physical Activity: Not on file   Stress: Not on file   Social Connections: Not on file   Intimate Partner Violence: Not on file   Housing Stability: Not on file       Family History   Problem Relation Age of Onset     Hypertension Maternal Grandmother      Depression Maternal Grandmother      Hypertension Maternal Grandfather      Hypertension Paternal Grandmother      Hypertension Paternal Grandfather      Diabetes Paternal Grandfather      Depression Mother      Asthma Brother      No Known Problems Brother      No Known Problems Brother      Coronary Artery Disease Paternal Uncle      No Known Problems Daughter      No Known Problems Daughter      No Known Problems Daughter      Hyperlipidemia No family hx of      Cerebrovascular Disease No family hx of      Colon Cancer No family hx of      Prostate Cancer No family hx of      Breast Cancer No family hx of            EXAM:  Ht 1.829 m  (6')   Wt 135.2 kg (298 lb)   BMI 40.42 kg/m    GENERAL: Alert and no distress, full beard  EYES: Eyes grossly normal to inspection.  No discharge or erythema, or obvious scleral/conjunctival abnormalities.  RESP: No audible wheeze, cough, or visible cyanosis.  No visible retractions or increased work of breathing.    SKIN: Visible skin clear. No significant rash, abnormal pigmentation or lesions.  NEURO: Cranial nerves grossly intact.  Mentation and speech appropriate for age.  PSYCH: Mentation appears normal, affect normal, judgement and insight intact, normal speech and appearance well-groomed.     TSH   Date Value Ref Range Status   10/21/2016 2.74 0.40 - 4.00 mU/L Final       ASSESSMENT:  31-year-old gentleman with morbid obesity, depression on one medication, snoring and observed apneas.  Overall he is at intermediate risk for obstructive sleep apnea.  Identifying and treating obstructive sleep apnea is medically indicated.    PLAN:  He is a good candidate for home sleep apnea testing.  He is agreeable.  We reviewed the pathophysiology of obstructive sleep apnea, testing options, indications for treatment and treatment options.  Strongly urged ongoing efforts at weight management.  I will be contacting patient with results of the home sleep apnea test via [employer] when they become available.  He is agreeable with this plan.      42 minutes spent on the date of the encounter doing chart review, history and exam, documentation and further activities per the note    Ivette Horvath M.D.  Pulmonary/Critical Care/Sleep Medicine    [facility]   Floor 1, [address]   [address] [place], [place] [address]. [place] [address]   Appointments: [phone]    The above note was dictated using voice recognition software and may include typographical errors. Please contact the author for any clarifications.                Again, thank you for allowing me to participate in  the care of your patient.        Sincerely,        Ivette Horvath MD

## 2022-08-03 NOTE — PROGRESS NOTES
Jhony is a 31 year old who is being evaluated via a billable video visit.      How would you like to obtain your AVS? MyChart  If the video visit is dropped, the invitation should be resent by: Send to e-mail at: [email or web address]  Will anyone else be joining your video visit? No    Telma Garcia    Video-Visit Details    Video Start Time: 1:02 PM    Type of service:  Video Visit    Video End Time:1:27 PM    Originating Location (pt. Location): Other Office    Distant Location (provider location):  [facility]     Platform used for Video Visit: [employer]     Chief complaint: Consultation requested by Annalisa Brizuela NP for evaluation of snoring and observed apnea    History of Present Illness: 31-year-old gentleman who is urged to be evaluated by his wife.  She has been observing progressively loud snoring and increasing frequency of apnea.  She is noticing this 4-5 times a week.  She will notched him to wake him up.  He will switch positions.  The episodes occur less frequently on his sides.  He has gained some weight in the last few years and has been actively working on weight loss recently.  He thinks there has been a little bit of improvement least recently.  He does wake up feeling unrefreshed but otherwise does well during the day.  He is not drinking caffeinated beverages routinely.  He is not routinely taking naps.  He does get a little drowsy at work sometimes during less engaging activities.  But there has been no impact on his productivity or function.  No sleepiness behind the wheel.    He occasionally will have a dry mouth but denies waking up with sore throat or headaches.  Denies waking up with gasping or choking or shortness of breath.  No difficulty breathing through his nose at night.  No significant issues with nocturia.  He is dentist has observed some evidence of bruxism but patient denies waking up with sore jaws or headaches.    He occasionally  feels restlessness in his legs while watching TV in the evening but usually that resolves when he gets up and gets ready for bed.  Does not impact his ability to fall asleep.  No issues with insomnia.  He keeps a reasonably  regular schedule going to bed between 11 and 12 and getting up around 7.  He does not take any sleep aids.  He does not drink alcohol with any regularity.    No known family history of sleep apnea.    No problems with nightmares, sleepwalking, sleep talking or dream enactment behavior.    Depression is well controlled on 1 medication.    [place] Sleepiness Scale   Sitting and reading: Slight chance of dozing   Watching TV: Would never doze   Sitting, inactive in a public place (e.g. a theatre or a meeting): Would never doze   As a passenger in a car for an hour without a break: High chance of dozing   Lying down to rest in the afternoon when circumstances permit: Slight chance of dozing   Sitting and talking to someone: Would never doze   Sitting quietly after a lunch without alcohol: Would never doze   In a car, while stopped for a few minutes in traffic: Would never doze   Total score - [place]: 5   (Less than 10 normal)    Insomnia Severity Scale  RACHEL Total Score: 7  Total score categories:  0-7 = No clinically significant insomnia   8-14 = Subthreshold insomnia   15-21 = Clinical insomnia (moderate severity)  22-28 = Clinical insomnia (severe)    STOP-BANG  Loud Snore   1  Excessively Tired/Sleepy   0  Observed apnea   1  Hypertension   0  BMI> 35 kg/m2   1  Age >50   0  Neck >16 in/40cm   1  Male Gender   1  Total =   5  (0-2 low, 3-4 intermediate, 5-8 high risk of YURIY)      Past Medical History:   Diagnosis Date     Depressive disorder 10/21/2016       No Known Allergies    Current Outpatient Medications   Medication     FLUoxetine (PROZAC) 20 MG capsule     No current facility-administered medications for this visit.       Social History     Socioeconomic History     Marital status:       Spouse name: Jannette     Number of children: 3     Years of education: Not on file     Highest education level: Not on file   Occupational History     Occupation: [employer]     Tobacco Use     Smoking status: Never Smoker     Smokeless tobacco: Never Used   Vaping Use     Vaping Use: Never used   Substance and Sexual Activity     Alcohol use: Yes     Comment: Social     Drug use: No     Sexual activity: Yes     Partners: Female     Birth control/protection: Condom, Natural Family Planning   Other Topics Concern     Parent/sibling w/ CABG, MI or angioplasty before 65F 55M? No   Social History Narrative     Not on file     Social Determinants of Health     Financial Resource Strain: Not on file   Food Insecurity: Not on file   Transportation Needs: Not on file   Physical Activity: Not on file   Stress: Not on file   Social Connections: Not on file   Intimate Partner Violence: Not on file   Housing Stability: Not on file       Family History   Problem Relation Age of Onset     Hypertension Maternal Grandmother      Depression Maternal Grandmother      Hypertension Maternal Grandfather      Hypertension Paternal Grandmother      Hypertension Paternal Grandfather      Diabetes Paternal Grandfather      Depression Mother      Asthma Brother      No Known Problems Brother      No Known Problems Brother      Coronary Artery Disease Paternal Uncle      No Known Problems Daughter      No Known Problems Daughter      No Known Problems Daughter      Hyperlipidemia No family hx of      Cerebrovascular Disease No family hx of      Colon Cancer No family hx of      Prostate Cancer No family hx of      Breast Cancer No family hx of            EXAM:  Ht 1.829 m (6')   Wt 135.2 kg (298 lb)   BMI 40.42 kg/m    GENERAL: Alert and no distress, full beard  EYES: Eyes grossly normal to inspection.  No discharge or erythema, or obvious scleral/conjunctival abnormalities.  RESP: No audible wheeze, cough, or visible  cyanosis.  No visible retractions or increased work of breathing.    SKIN: Visible skin clear. No significant rash, abnormal pigmentation or lesions.  NEURO: Cranial nerves grossly intact.  Mentation and speech appropriate for age.  PSYCH: Mentation appears normal, affect normal, judgement and insight intact, normal speech and appearance well-groomed.     TSH   Date Value Ref Range Status   10/21/2016 2.74 0.40 - 4.00 mU/L Final       ASSESSMENT:  31-year-old gentleman with morbid obesity, depression on one medication, snoring and observed apneas.  Overall he is at intermediate risk for obstructive sleep apnea.  Identifying and treating obstructive sleep apnea is medically indicated.    PLAN:  He is a good candidate for home sleep apnea testing.  He is agreeable.  We reviewed the pathophysiology of obstructive sleep apnea, testing options, indications for treatment and treatment options.  Strongly urged ongoing efforts at weight management.  I will be contacting patient with results of the home sleep apnea test via [employer] when they become available.  He is agreeable with this plan.      42 minutes spent on the date of the encounter doing chart review, history and exam, documentation and further activities per the note    Ivette Horvath M.D.  Pulmonary/Critical Care/Sleep Medicine    [facility]   Floor 1, [address]   [address] [place], [place] [address]. [place] [address]   Appointments: [phone]    The above note was dictated using voice recognition software and may include typographical errors. Please contact the author for any clarifications.

## 2022-08-04 NOTE — NURSING NOTE
My Chart message sent to patient:  Wicho Booker,    This is a quick message following up on your 8/3/2022 appointment with Dr. Horvath.  It looks like she ordered a lidia sleep study and a follow-up appointment with her to go over the results of sleep study. If this is something you would like to move forward in scheduling, please call us at [phone].      Thank you!    [facility]  Yin Rhodes CMA

## 2024-11-01 ASSESSMENT — SLEEP AND FATIGUE QUESTIONNAIRES
HOW LIKELY ARE YOU TO NOD OFF OR FALL ASLEEP WHILE WATCHING TV: SLIGHT CHANCE OF DOZING
HOW LIKELY ARE YOU TO NOD OFF OR FALL ASLEEP WHILE SITTING AND READING: MODERATE CHANCE OF DOZING
HOW LIKELY ARE YOU TO NOD OFF OR FALL ASLEEP WHILE LYING DOWN TO REST IN THE AFTERNOON WHEN CIRCUMSTANCES PERMIT: HIGH CHANCE OF DOZING
HOW LIKELY ARE YOU TO NOD OFF OR FALL ASLEEP IN A CAR, WHILE STOPPED FOR A FEW MINUTES IN TRAFFIC: WOULD NEVER DOZE
HOW LIKELY ARE YOU TO NOD OFF OR FALL ASLEEP WHEN YOU ARE A PASSENGER IN A CAR FOR AN HOUR WITHOUT A BREAK: HIGH CHANCE OF DOZING
HOW LIKELY ARE YOU TO NOD OFF OR FALL ASLEEP WHILE SITTING INACTIVE IN A PUBLIC PLACE: SLIGHT CHANCE OF DOZING
HOW LIKELY ARE YOU TO NOD OFF OR FALL ASLEEP WHILE SITTING AND TALKING TO SOMEONE: WOULD NEVER DOZE
HOW LIKELY ARE YOU TO NOD OFF OR FALL ASLEEP WHILE SITTING QUIETLY AFTER LUNCH WITHOUT ALCOHOL: SLIGHT CHANCE OF DOZING

## 2024-11-06 ENCOUNTER — VIRTUAL VISIT (OUTPATIENT)
Dept: SLEEP MEDICINE | Facility: CLINIC | Age: 34
End: 2024-11-06
Payer: COMMERCIAL

## 2024-11-06 VITALS — HEIGHT: 72 IN | BODY MASS INDEX: 39.28 KG/M2 | WEIGHT: 290 LBS

## 2024-11-06 DIAGNOSIS — G47.10 HYPERSOMNIA, UNSPECIFIED: Primary | ICD-10-CM

## 2024-11-06 DIAGNOSIS — G47.30 OBSERVED SLEEP APNEA: ICD-10-CM

## 2024-11-06 PROCEDURE — 99214 OFFICE O/P EST MOD 30 MIN: CPT | Mod: 95 | Performed by: INTERNAL MEDICINE

## 2024-11-06 ASSESSMENT — PAIN SCALES - GENERAL: PAINLEVEL_OUTOF10: NO PAIN (0)

## 2024-11-06 NOTE — NURSING NOTE
Current patient location: [address] [address]    Is the patient currently in the state of [place]? YES    Visit mode:VIDEO    If the visit is dropped, the patient can be reconnected by: VIDEO VISIT: Send to e-mail at: [email or web address]    Will anyone else be joining the visit? NO  (If patient encounters technical issues they should call [phone]    Are changes needed to the allergy or medication list? No    Are refills needed on medications prescribed by this physician? NO    Rooming Documentation:  Questionnaire(s) completed    Reason for visit: RECHRICO BOYDF

## 2024-11-06 NOTE — PROGRESS NOTES
Virtual Visit Details    Type of service:  Video Visit     Originating Location (pt. Location): Home    Distant Location (provider location):  On-site  Platform used for Video Visit: [employer]     Presenting History:     Mr. Jhony Murphy is a 34 years old male, without significant medical comorbidity who presents to clinic for an evaluation of poor sleep.     He works as a .     Jhony does snore every night. Patient's wife does complain of snoring, gasping, and poor quality of sleep. He does have witnessed apneas.They never sleep separately.  Patient sleeps on his back and side. He denies no morning dry mouth, morning headaches, and restless legs.     Jhony denies any sleep walking, sleep talking, dream enactment, sleep paralysis, cataplexy, and hypnogogic/hypnopompic hallucinations.    Jhony goes to sleep at 11:00 PM during the week. He wakes up at 7:00 AM. He falls asleep in 15 minutes.  Jhony denies difficulty falling asleep.  He wakes up 0-1 times a night for 5 minutes before falling back to sleep.  Jhony wakes up to uncertain reasons.  On weekends, Jhoyn goes to sleep at 11:00 PM.  He wakes up at 7:00 AM. He falls asleep in 15 minutes.  Patient gets an average of 8 hours of sleep per night.    Patient's Natalia Sleepiness score 11/24 consistent with daytime sleepiness.      Jhony naps 0 times per week. He takes some inadvertant naps.  He denies closing eyes, dozing, and falling asleep while driving. Patient was counseled on the importance of driving while alert, to pull over if drowsy, or nap before getting into the vehicle if sleepy.      He uses 1 cups/day of coffee. Last caffeine intake is usually before noon.    Past Medical History:   Diagnosis Date    Depressive disorder 10/21/2016     Ht 1.829 m (6')   Wt 131.5 kg (290 lb)   BMI 39.33 kg/m        Physical Examination:  GENERAL APPEARANCE: healthy, alert, and no distress  NEURO: mentation intact and speech  normal  PSYCH: mentation appears normal and affect normal/bright    Impression & Plan:     Probable obstructive sleep apnea  Excessive daytime sleepiness    Patient is a 34 years old male, with a BMI of 39, who presents with history of frequent loud snoring, witnessed apneas, nonrefreshing sleep and daytime sleepiness.  There is high pretest probability for obstructive sleep apnea, and an overnight sleep study is recommended for further evaluation.    Plan:     Home sleep apnea testing  Follow-up after sleep study    I spent a total of 30 minutes for this appointment on this date of service which include time spent before, during and after the visit for chart review, patient care, counseling and coordination of care.    Electronically signed by Dr. Calderon Gonzalez, Diplomate, Sleep Medicine, ABPN

## 2025-02-01 NOTE — PATIENT INSTRUCTIONS
"      MY TREATMENT INFORMATION FOR SLEEP APNEA-  Jhony Murphy    DOCTOR : Ivette Horvath MD    Am I having a sleep study at a sleep center?  --->Due to normal delays, you will be contacted within 2-4 weeks to schedule    Am I having a home sleep study?  --->Watch the video for the device you are using:    -/drop off device-   https://www.SCVNGR.com/watch?v=yGGFBdELGhk        Frequently asked questions:  1. What is Obstructive Sleep Apnea (YURIY)? YURIY is the most common type of sleep apnea. Apnea means, \"without breath.\"  Apnea is most often caused by narrowing or collapse of the upper airway as muscles relax during sleep.   Almost everyone has occasional apneas. Most people with sleep apnea have had brief interruptions at night frequently for many years.  The severity of sleep apnea is related to how frequent and severe the events are.   2. What are the consequences of YURIY? Symptoms include: feeling sleepy during the day, snoring loudly, gasping or stopping of breathing, trouble sleeping, and occasionally morning headaches or heartburn at night.  Sleepiness can be serious and even increase the risk of falling asleep while driving. Other health consequences may include development of high blood pressure and other cardiovascular disease in persons who are susceptible. Untreated YURIY  can contribute to heart disease, stroke and diabetes.   3. What are the treatment options? In most situations, sleep apnea is a lifelong disease that must be managed with daily therapy. Medications are not effective for sleep apnea and surgery is generally not considered until other therapies have been tried. Your treatment is your choice . Continuous Positive Airway (CPAP) works right away and is the therapy that is effective in nearly everyone. An oral device to hold your jaw forward is usually the next most reliable option. Other options include postioning devices (to keep you off your back), weight loss, and surgery " including a tongue pacing device. There is more detail about some of these options below.  4. Are my sleep studies covered by insurance? Although we will request verification of coverage, we advise you also check in advance of the study to ensure there is coverage.    Important tips for those choosing CPAP and similar devices   Know your equipment:  CPAP is continuous positive airway pressure that prevents obstructive sleep apnea by keeping the throat from collapsing while you are sleeping. In most cases, the device is  smart  and can slowly self-adjusts if your throat collapses and keeps a record every day of how well you are treated-this information is available to you and your care team.  BPAP is bilevel positive airway pressure that keeps your throat open and also assists each breath with a pressure boost to maintain adequate breathing.  Special kinds of BPAP are used in patients who have inadequate breathing from lung or heart disease. In most cases, the device is  smart  and can slowly self-adjusts to assist breathing. Like CPAP, the device keeps a record of how well you are treated.  Your mask is your connection to the device. You get to choose what feels most comfortable and the staff will help to make sure if fits. Here: are some examples of the different masks that are available:       Key points to remember on your journey with sleep apnea:  Sleep study.  PAP devices often need to be adjusted during a sleep study to show that they are effective and adjusted right.  Good tips to remember: Try wearing just the mask during a quiet time during the day so your body adapts to wearing it. A humidifier is recommended for comfort in most cases to prevent drying of your nose and throat. Allergy medication from your provider may help you if you are having nasal congestion.  Getting settled-in. It takes more than one night for most of us to get used to wearing a mask. Try wearing just the mask during a quiet time  during the day so your body adapts to wearing it. A humidifier is recommended for comfort in most cases. Our team will work with you carefully on the first day and will be in contact within 4 days and again at 2 and 4 weeks for advice and remote device adjustments. Your therapy is evaluated by the device each day.   Use it every night. The more you are able to sleep naturally for 7-8 hours, the more likely you will have good sleep and to prevent health risks or symptoms from sleep apnea. Even if you use it 4 hours it helps. Occasionally all of us are unable to use a medical therapy, in sleep apnea, it is not dangerous to miss one night.   Communicate. Call our skilled team on the number provided on the first day if your visit for problems that make it difficult to wear the device. Over 2 out of 3 patients can learn to wear the device long-term with help from our team. Remember to call our team or your sleep providers if you are unable to wear the device as we may have other solutions for those who cannot adapt to mask CPAP therapy. It is recommended that you sleep your sleep provider within the first 3 months and yearly after that if you are not having problems.   Use it for your health. We encourage use of CPAP masks during daytime quiet periods to allow your face and brain to adapt to the sensation of CPAP so that it will be a more natural sensation to awaken to at night or during naps. This can be very useful during the first few weeks or months of adapting to CPAP though it does not help medically to wear CPAP during wakefulness and  should not be used as a strategy just to meet guidelines.  Take care of your equipment. Make sure you clean your mask and tubing using directions every day and that your filter and mask are replaced as recommended or if they are not working.     BESIDES CPAP, WHAT OTHER THERAPIES ARE THERE?    Positioning Device  Positioning devices are generally used when sleep apnea is mild and  only occurs on your back.This example shows a pillow that straps around the waist. It may be appropriate for those whose sleep study shows milder sleep apnea that occurs primarily when lying flat on one's back. Preliminary studies have shown benefit but effectiveness at home may need to be verified by a home sleep test. These devices are generally not covered by medical insurance.  Examples of devices that maintain sleeping on the back to prevent snoring and mild sleep apnea.    Belt type body positioner  http://[employer]Amplience/    Electronic reminder  http://nightshifttherapy.com/  http://www.[employer]pl[employer].au/      Oral Appliance  What is oral appliance therapy?  An oral appliance device fits on your teeth at night like a retainer used after having braces. The device is made by a specialized dentist and requires several visits over 1-2 months before a manufactured device is made to fit your teeth and is adjusted to prevent your sleep apnea. Once an oral device is working properly, snoring should be improved. A home sleep test may be recommended at that time if to determine whether the sleep apnea is adequately treated.       Some things to remember:  -Oral devices are often, but not always, covered by your medical insurance. Be sure to check with your insurance provider.   -If you are referred for oral therapy, you will be given a list of specialized dentists to consider or you may choose to visit the Web site of the American Academy of Dental Sleep Medicine  -Oral devices are less likely to work if you have severe sleep apnea or are extremely overweight.     More detailed information  An oral appliance is a small acrylic device that fits over the upper and lower teeth  (similar to a retainer or a mouth guard). This device slightly moves jaw forward, which moves the base of the tongue forward, opens the airway, improves breathing for effective treat snoring and obstructive sleep apnea in perhaps 7 out of 10 people .   The best working devices are custom-made by a dental device  after a mold is made of the teeth 1, 2, 3.  When is an oral appliance indicated?  Oral appliance therapy is recommended as a first-line treatment for patients with primary snoring, mild sleep apnea, and for patients with moderate sleep apnea who prefer appliance therapy to use of CPAP4, 5. Severity of sleep apnea is determined by sleep testing and is based on the number of respiratory events per hour of sleep.   How successful is oral appliance therapy?  The success rate of oral appliance therapy in patients with mild sleep apnea is 75-80% while in patients with moderate sleep apnea it is 50-70%. The chance of success in patients with severe sleep apnea is 40-50%. The research also shows that oral appliances have a beneficial effect on the cardiovascular health of YURIY patients at the same magnitude as CPAP therapy7.  Oral appliances should be a second-line treatment in cases of severe sleep apnea, but if not completely successful then a combination therapy utilizing CPAP plus oral appliance therapy may be effective. Oral appliances tend to be effective in a broad range of patients although studies show that the patients who have the highest success are females, younger patients, those with milder disease, and less severe obesity. 3, 6.   Finding a dentist that practices dental sleep medicine  Specific training is available through the American Academy of Dental Sleep Medicine for dentists interested in working in the field of sleep. To find a dentist who is educated in the field of sleep and the use of oral appliances, near you, visit the Web site of the American Academy of Dental Sleep Medicine.    References  1. Ángel et al. Objectively measured vs self-reported compliance during oral appliance therapy for sleep-disordered breathing. Chest 2013; 144(5): 6979-3697.  2. Silvano et al. Objective measurement of compliance during oral  appliance therapy for sleep-disordered breathing. Thorax 2013; 68(1): 91-96.  3. Radha, et al. Mandibular advancement devices in 620 men and women with YURIY and snoring: tolerability and predictors of treatment success. Chest 2004; 125: 3369-8593.  4. Lisha et al. Oral appliances for snoring and YURIY: a review. Sleep 2006; 29: 244-262.  5. Deidra et al. Oral appliance treatment for YURIY: an update. J Clin Sleep Med 2014; 10(2): 215-227.  6. Starr et al. Predictors of OSAH treatment outcome. J Dent Res 2007; 86: 7114-8051.      Weight Loss:    Weight loss is a long-term strategy that may improve sleep apnea in some patients.    Weight management is a personal decision and the decision should be based on your interest and the potential benefits.  If you are interested in exploring weight loss strategies, the following discussion covers the impact on weight loss on sleep apnea and the approaches that may be successful.    Being overweight does not necessarily mean you will have health consequences.  Those who have BMI over 35 or over 27 with existing medical conditions carries greater risk.   Weight loss decreases severity of sleep apnea in most people with obesity. For those with mild obesity who have developed snoring with weight gain, even 15-30 pound weight loss can improve and occasionally eliminate sleep apnea.  Structured and life-long dietary and health habits are necessary to lose weight and keep healthier weight levels.     Though there may be significant health benefits from weight loss, long-term weight loss is very difficult to achieve- studies show success with dietary management in less than 10% of people. In addition, substantial weight loss may require years of dietary control and may be difficult if patients have severe obesity. In these cases, surgical management may be considered.  Finally, older individuals who have tolerated obesity without health complications may be less likely to  benefit from weight loss strategies.          Surgery:    Surgery for obstructive sleep apnea is considered generally only when other therapies fail to work. Surgery may be discussed with you if you are having a difficult time tolerating CPAP and or when there is an abnormal structure that requires surgical correction.  Nose and throat surgeries often enlarge the airway to prevent collapse.  Most of these surgeries create pain for 1-2 weeks and up to half of the most common surgeries are not effective throughout life.  You should carefully discuss the benefits and drawbacks to surgery with your sleep provider and surgeon to determine if it is the best solution for you.   More information  Surgery for YURIY is directed at areas that are responsible for narrowing or complete obstruction of the airway during sleep.  There are a wide range of procedures available to enlarge and/or stabilize the airway to prevent blockage of breathing in the three major areas where it can occur: the palate, tongue, and nasal regions.  Successful surgical treatment depends on the accurate identification of the factors responsible for obstructive sleep apnea in each person.  A personalized approach is required because there is no single treatment that works well for everyone.  Because of anatomic variation, consultation with an examination by a sleep surgeon is a critical first step in determining what surgical options are best for each patient.  In some cases, examination during sedation may be recommended in order to guide the selection of procedures.  Patients will be counseled about risks and benefits as well as the typical recovery course after surgery. Surgery is typically not a cure for a person s YURIY.  However, surgery will often significantly improve one s YURIY severity (termed  success rate ).  Even in the absence of a cure, surgery will decrease the cardiovascular risk associated with OSA7; improve overall quality of life8  (sleepiness, functionality, sleep quality, etc).      Palate Procedures:  Patients with YURIY often have narrowing of their airway in the region of their tonsils and uvula.  The goals of palate procedures are to widen the airway in this region as well as to help the tissues resist collapse.  Modern palate procedure techniques focus on tissue conservation and soft tissue rearrangement, rather than tissue removal.  Often the uvula is preserved in this procedure. Residual sleep apnea is common in patient after pharyngoplasty with an average reduction in sleep apnea events of 33%2.      Tongue Procedures:  ExamWhile patients are awake, the muscles that surround the throat are active and keep this region open for breathing. These muscles relax during sleep, allowing the tongue and other structures to collapse and block breathing.  There are several different tongue procedures available.  Selection of a tongue base procedure depends on characteristics seen on physical exam.  Generally, procedures are aimed at removing bulky tissues in this area or preventing the back of the tongue from falling back during sleep.  Success rates for tongue surgery range from 50-62%3.    Hypoglossal Nerve Stimulation:  Hypoglossal nerve stimulation has recently received approval from the [place] Food and Drug Administration for the treatment of obstructive sleep apnea.  This is based on research showing that the system was safe and effective in treating sleep apnea6.  Results showed that the median AHI score decreased 68%, from 29.3 to 9.0. This therapy uses an implant system that senses breathing patterns and delivers mild stimulation to airway muscles, which keeps the airway open during sleep.  The system consists of three fully implanted components: a small generator (similar in size to a pacemaker), a breathing sensor, and a stimulation lead.  Using a small handheld remote, a patient turns the therapy on before bed and off upon  awakening.    Candidates for this device must be greater than 22 years of age, have moderate to severe YURIY (AHI between 20-65), BMI less than 32, have tried CPAP/oral appliance without success, and have appropriate upper airway anatomy (determined by a sleep endoscopy performed by Dr. De La Cruz).    Hypoglossal Nerve Stimulation Pathway:    The sleep surgeon s office will work with the patient through the insurance prior-authorization process (including communications and appeals).    Nasal Procedures:  Nasal obstruction can interfere with nasal breathing during the day and night.  Studies have shown that relief of nasal obstruction can improve the ability of some patients to tolerate positive airway pressure therapy for obstructive sleep apnea1.  Treatment options include medications such as nasal saline, topical corticosteroid and antihistamine sprays, and oral medications such as antihistamines or decongestants. Non-surgical treatments can include external nasal dilators for selected patients. If these are not successful by themselves, surgery can improve the nasal airway either alone or in combination with these other options.      Combination Procedures:  Combination of surgical procedures and other treatments may be recommended, particularly if patients have more than one area of narrowing or persistent positional disease.  The success rate of combination surgery ranges from 66-80%2,3.    References  Mitch QUAN. The Role of the Nose in Snoring and Obstructive Sleep Apnoea: An Update.  Eur Arch Otorhinolaryngol. 2011; 268: 1365-73.   Lorna SM; Jermaine JA; Familia JR; Pallanch JF; Rudy MB; Sharifa SG; Isma MCKAY. Surgical modifications of the upper airway for obstructive sleep apnea in adults: a systematic review and meta-analysis. SLEEP 2010;33(10):3957-2778. Antonio ESTEVES. Hypopharyngeal surgery in obstructive sleep apnea: an evidence-based medicine review.  Arch Otolaryngol Head Neck Surg. 2006  Feb;132(2):206-13.  Toney YH1, Azalea Y, Greg DANI. The efficacy of anatomically based multilevel surgery for obstructive sleep apnea. Otolaryngol Head Neck Surg. 2003 Oct;129(4):327-35.  Antonio ESTEVES, Goldberg A. Hypopharyngeal Surgery in Obstructive Sleep Apnea: An Evidence-Based Medicine Review. Arch Otolaryngol Head Neck Surg. 2006 Feb;132(2):206-13.  Omer ZARAGOZA et al. Upper-Airway Stimulation for Obstructive Sleep Apnea.  N Engl J Med. 2014 Jan 9;370(2):139-49.  Pemary Y et al. Increased Incidence of Cardiovascular Disease in Middle-aged Men with Obstructive Sleep Apnea. Am J Respir Crit Care Med; 2002 166: 159-165  Hamalu RODRIGUEZ et al. Studying Life Effects and Effectiveness of Palatopharyngoplasty (SLEEP) study: Subjective Outcomes of Isolated Uvulopalatopharyngoplasty. Otolaryngol Head Neck Surg. 2011; 144: 623-631.        WHAT IF I ONLY HAVE SNORING?    Mandibular advancement devices, lateral sleep positioning, long-term weight loss and treatment of nasal allergies have been shown to improve snoring.  Exercising tongue muscles with a game (https://www.ncbi.nlm.nih.gov/pubmed/82440814) or stimulating the tongue during the day with a device (https://doi.org/10.3390/gdl07781677) have improved snoring in some individuals.    Remember to Drive Safe... Drive Alive     Sleep health profoundly affects your health, mood, and your safety.  Thirty three percent of the population (one in three of us) is not getting enough sleep and many have a sleep disorder. Not getting enough sleep or having an untreated / undertreated sleep condition may make us sleepy without even knowing it. In fact, our driving could be dramatically impaired due to our sleep health. As your provider, here are some things I would like you to know about driving:     Here are some warning signs for impairment and dangerous drowsy driving:              -Having been awake more than 16 hours               -Looking tired               -Eyelid drooping               -Head nodding (it could be too late at this point)              -Driving for more than 30 minutes     Some things you could do to make the driving safer if you are experiencing some drowsiness:              -Stop driving and rest              -Call for transportation              -Make sure your sleep disorder is adequately treated     Some things that have been shown NOT to work when experiencing drowsiness while driving:              -Turning on the radio              -Opening windows              -Eating any  distracting  /  entertaining  foods (e.g., sunflower seeds, candy, or any other)              -Talking on the phone      Your decision may not only impact your life, but also the life of others. Please, remember to drive safe for yourself and all of us.            Yes